# Patient Record
Sex: FEMALE | Race: BLACK OR AFRICAN AMERICAN
[De-identification: names, ages, dates, MRNs, and addresses within clinical notes are randomized per-mention and may not be internally consistent; named-entity substitution may affect disease eponyms.]

---

## 2018-03-14 NOTE — SURGICARE DISCHARGE SUMMARY E
[facility] Discharge Summary



NAME: DAFNE SMITH

                                      MRN: [id number]

                                      AGE: 27Y

ADMITTED: 03/14/2018           DISCHARGED: 03/14/2018



SURGICAL PROCEDURE:

Arthroplasty at the distal interphalangeal joint, fourth digit, right foot.



POSTOPERATIVE DIAGNOSIS:

Mallet toe deformity, fourth digit, right foot.



SURGEON:

Edu Barahona DPM



ASSISTANT:

Mahad Saravia DPM



HOSPITAL COURSE:

The patient was admitted to [facility] with chief complaint

of a painful fourth toe on her right foot.  She stated she was getting a

corn on it and that whenever she wore her shoe it rubbed on top of her shoe

and was sore.  Patient desired to have this problem surgically corrected

and underwent the above surgical procedure without any complications.  She

was transferred to the recovery room.  Patient was discharged with an ice

pack, a surgical shoe, postoperative instructions, and postoperative

prescription for Percocet 5 mg/325 mg #40 and Phenergan 25 mg #20.  She has

a follow-up appointment in doctor's office in 1 week and she was discharged

from [facility].





DICTATING PHYSICIAN: EDU BARAHONA D.P.M.



[id number]              DT: 03/14/2018 1352

PHY#: [id number]          DD: 03/14/2018 1345

ID:   [id number]               JOB#: [id number]       ACCT: [id number]



cc:EDU BARAHONA DPM

>

## 2018-03-14 NOTE — SURGICARE OPERATIVE REPORT E
[facility]



NAME: DAFNE SMITH

                                      MRN: [id number]

                             AGE: 27Y

DATE OF SURGERY: 03/14/2018                   ROOM:



PREOPERATIVE DIAGNOSIS:

Mallet toe deformity fourth digit, right foot.



POSTOPERATIVE DIAGNOSIS:

Mallet toe deformity fourth digit, right foot.



SURGICAL PROCEDURE:

Arthroplasty at the distal interphalangeal joint fourth digit, right foot.



SURGEON:

EDU LAMBERT DPM



ASSISTANT:

GARRET YI DPM



PROCEDURE:

Following induction of IV regional local anesthesia, the right foot and leg

were prepped and draped in the usual sterile manner.  Pneumatic tourniquet

was placed around the right ankle and inflated to 250 mmHg after

exsanguination of the limb via Esmarch bandage.  The following surgical

procedure was then performed:  Arthroplasty of the distal interphalangeal

joint fourth digit right foot.  Attention was directed to the dorsal aspect

of the fourth toe of the right foot where a semi-elliptical incision was

made at the distal interphalangeal joint.  The resulting skin wedge was

removed en toto from the

wound.  The extensor digitorum longus tendon was sharply incised in a

transverse fashion and it was freed from the proximal aspect of the bone. 

The hypertrophied head of the middle phalanx was freed from its soft tissue

attachments via sharp dissection.  Utilizing Low bone cutting forceps,

the hypertrophied head of the middle phalanx was osteotomized perpendicular

to the long axis of the bone and removed en toto from the wound.  The head

of the middle phalanx was then rasped smooth utilizing a handheld crosscut

rasp.  The attention was directed to the plantar aspect of the wound, and

utilizing sharp dissection, the plantar plate was excised.  The area was

then flushed with copious amounts of antibacterial saline solution.  The

extensor digitorum longus tendon was then sutured plantarly into the flexor

digitorum longus tendon utilizing simple interrupted suture of 3-0 Vicryl. 

The extensor digitorum longus tendon was then sutured medially and

laterally utilizing simple interrupted sutures of 3-0 Vicryl.  The skin was

then coapted and maintained utilizing horizontal mattress sutures of 5-0

nylon.  A dry sterile dressing was then applied consisting of Canelo silk, 4

x 4s, Conform, Kerlix, and Coban.  Pneumatic tourniquet was released.  It

was noted that all digits were warm and viable, and the patient was

transferred to the recovery room.





DICTATING PHYSICIAN: EDU LAMBERT D.P.M.



[id number]              DT: 03/14/2018 1342

PHY#: [id number]          DD: 03/14/2018 1343

ID:   [id number]               JOB#: [id number]       ACCT: [id number]



cc:EDU LAMBERT DPM

>







ANDRE

## 2021-01-22 ENCOUNTER — HOSPITAL ENCOUNTER (OUTPATIENT)
Dept: HOSPITAL 62 - CCC | Age: 31
End: 2021-01-22
Attending: FAMILY MEDICINE
Payer: COMMERCIAL

## 2021-01-22 DIAGNOSIS — D64.9: Primary | ICD-10-CM

## 2021-01-22 LAB
ADD MANUAL DIFF: NO
ALBUMIN SERPL-MCNC: 4.2 G/DL (ref 3.5–5)
ALP SERPL-CCNC: 57 U/L (ref 38–126)
ANION GAP SERPL CALC-SCNC: 8 MMOL/L (ref 5–19)
APPEARANCE UR: CLEAR
APTT PPP: (no result) S
AST SERPL-CCNC: 31 U/L (ref 14–36)
BASOPHILS # BLD AUTO: 0 10^3/UL (ref 0–0.2)
BASOPHILS NFR BLD AUTO: 1.3 % (ref 0–2)
BILIRUB DIRECT SERPL-MCNC: 0.1 MG/DL (ref 0–0.4)
BILIRUB SERPL-MCNC: 0.4 MG/DL (ref 0.2–1.3)
BILIRUB UR QL STRIP: NEGATIVE
BUN SERPL-MCNC: 3 MG/DL (ref 7–20)
CALCIUM: 9.5 MG/DL (ref 8.4–10.2)
CHLORIDE SERPL-SCNC: 105 MMOL/L (ref 98–107)
CHOLEST SERPL-MCNC: 179.34 MG/DL (ref 0–200)
CO2 SERPL-SCNC: 25 MMOL/L (ref 22–30)
EOSINOPHIL # BLD AUTO: 0 10^3/UL (ref 0–0.6)
EOSINOPHIL NFR BLD AUTO: 0.9 % (ref 0–6)
ERYTHROCYTE [DISTWIDTH] IN BLOOD BY AUTOMATED COUNT: 17.2 % (ref 11.5–14)
FREE T4 (FREE THYROXINE): 1.14 NG/DL (ref 0.78–2.19)
GLUCOSE SERPL-MCNC: 87 MG/DL (ref 75–110)
GLUCOSE UR STRIP-MCNC: NEGATIVE MG/DL
HCT VFR BLD CALC: 30.2 % (ref 36–47)
HGB BLD-MCNC: 9.7 G/DL (ref 12–15.5)
KETONES UR STRIP-MCNC: (no result) MG/DL
LDLC SERPL DIRECT ASSAY-MCNC: 88 MG/DL (ref ?–100)
LYMPHOCYTES # BLD AUTO: 1.5 10^3/UL (ref 0.5–4.7)
LYMPHOCYTES NFR BLD AUTO: 46.5 % (ref 13–45)
MCH RBC QN AUTO: 24.2 PG (ref 27–33.4)
MCHC RBC AUTO-ENTMCNC: 32.3 G/DL (ref 32–36)
MCV RBC AUTO: 75 FL (ref 80–97)
MONOCYTES # BLD AUTO: 0.4 10^3/UL (ref 0.1–1.4)
MONOCYTES NFR BLD AUTO: 10.9 % (ref 3–13)
NEUTROPHILS # BLD AUTO: 1.3 10^3/UL (ref 1.7–8.2)
NEUTS SEG NFR BLD AUTO: 40.4 % (ref 42–78)
NITRITE UR QL STRIP: NEGATIVE
PH UR STRIP: 6 [PH] (ref 5–9)
PLATELET # BLD: 198 10^3/UL (ref 150–450)
POTASSIUM SERPL-SCNC: 4.8 MMOL/L (ref 3.6–5)
PROT SERPL-MCNC: 7.4 G/DL (ref 6.3–8.2)
PROT UR STRIP-MCNC: NEGATIVE MG/DL
RBC # BLD AUTO: 4.03 10^6/UL (ref 3.72–5.28)
SP GR UR STRIP: 1
TOTAL CELLS COUNTED % (AUTO): 100 %
TRIGL SERPL-MCNC: 61 MG/DL (ref ?–150)
TSH SERPL-ACNC: 0.92 UIU/ML (ref 0.47–4.68)
UROBILINOGEN UR-MCNC: NEGATIVE MG/DL (ref ?–2)
VLDLC SERPL CALC-MCNC: 12 MG/DL (ref 10–31)
WBC # BLD AUTO: 3.3 10^3/UL (ref 4–10.5)

## 2021-01-22 PROCEDURE — 36415 COLL VENOUS BLD VENIPUNCTURE: CPT

## 2021-01-22 PROCEDURE — 84439 ASSAY OF FREE THYROXINE: CPT

## 2021-01-22 PROCEDURE — 84443 ASSAY THYROID STIM HORMONE: CPT

## 2021-01-22 PROCEDURE — 84436 ASSAY OF TOTAL THYROXINE: CPT

## 2021-01-22 PROCEDURE — 85025 COMPLETE CBC W/AUTO DIFF WBC: CPT

## 2021-01-22 PROCEDURE — 80053 COMPREHEN METABOLIC PANEL: CPT

## 2021-01-22 PROCEDURE — 80061 LIPID PANEL: CPT

## 2021-01-22 PROCEDURE — 81001 URINALYSIS AUTO W/SCOPE: CPT

## 2021-01-22 PROCEDURE — 83036 HEMOGLOBIN GLYCOSYLATED A1C: CPT
